# Patient Record
Sex: FEMALE | Race: WHITE | NOT HISPANIC OR LATINO
[De-identification: names, ages, dates, MRNs, and addresses within clinical notes are randomized per-mention and may not be internally consistent; named-entity substitution may affect disease eponyms.]

---
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
## 2018-02-02 ENCOUNTER — APPOINTMENT (OUTPATIENT)
Dept: PLASTIC SURGERY | Facility: CLINIC | Age: 18
End: 2018-02-02
Payer: SELF-PAY

## 2018-02-02 DIAGNOSIS — Z41.1 ENCOUNTER FOR COSMETIC SURGERY: ICD-10-CM

## 2018-02-08 PROBLEM — Z41.1 ENCOUNTER FOR COSMETIC PROCEDURE: Status: ACTIVE | Noted: 2017-09-04

## 2018-03-13 ENCOUNTER — RX RENEWAL (OUTPATIENT)
Age: 18
End: 2018-03-13

## 2018-05-06 ENCOUNTER — EMERGENCY (EMERGENCY)
Facility: HOSPITAL | Age: 18
LOS: 1 days | Discharge: HOME | End: 2018-05-06
Admitting: EMERGENCY MEDICINE
Payer: COMMERCIAL

## 2018-05-06 PROCEDURE — 99284 EMERGENCY DEPT VISIT MOD MDM: CPT | Mod: 25

## 2018-05-07 ENCOUNTER — EMERGENCY (EMERGENCY)
Facility: HOSPITAL | Age: 18
LOS: 1 days | Discharge: HOME | End: 2018-05-07
Admitting: EMERGENCY MEDICINE
Payer: COMMERCIAL

## 2018-05-07 PROCEDURE — 99284 EMERGENCY DEPT VISIT MOD MDM: CPT

## 2018-05-07 PROCEDURE — 99284 EMERGENCY DEPT VISIT MOD MDM: CPT | Mod: 25

## 2018-05-07 PROCEDURE — 80175 DRUG SCREEN QUAN LAMOTRIGINE: CPT

## 2018-05-07 PROCEDURE — 85027 COMPLETE CBC AUTOMATED: CPT

## 2018-05-07 PROCEDURE — 96361 HYDRATE IV INFUSION ADD-ON: CPT

## 2018-05-07 PROCEDURE — 80048 BASIC METABOLIC PNL TOTAL CA: CPT

## 2018-05-07 PROCEDURE — 80177 DRUG SCRN QUAN LEVETIRACETAM: CPT

## 2018-05-07 PROCEDURE — 80307 DRUG TEST PRSMV CHEM ANLYZR: CPT

## 2018-05-07 PROCEDURE — 81025 URINE PREGNANCY TEST: CPT

## 2018-05-07 PROCEDURE — 80076 HEPATIC FUNCTION PANEL: CPT

## 2018-05-07 PROCEDURE — 70450 CT HEAD/BRAIN W/O DYE: CPT

## 2018-05-07 PROCEDURE — 70450 CT HEAD/BRAIN W/O DYE: CPT | Mod: 26

## 2018-05-07 PROCEDURE — 96374 THER/PROPH/DIAG INJ IV PUSH: CPT

## 2018-05-10 ENCOUNTER — APPOINTMENT (OUTPATIENT)
Dept: NEUROLOGY | Facility: CLINIC | Age: 18
End: 2018-05-10

## 2018-05-18 ENCOUNTER — APPOINTMENT (OUTPATIENT)
Dept: NEUROLOGY | Facility: CLINIC | Age: 18
End: 2018-05-18
Payer: COMMERCIAL

## 2018-05-18 VITALS
HEART RATE: 72 BPM | WEIGHT: 135 LBS | SYSTOLIC BLOOD PRESSURE: 119 MMHG | HEIGHT: 66 IN | DIASTOLIC BLOOD PRESSURE: 78 MMHG | BODY MASS INDEX: 21.69 KG/M2

## 2018-05-18 DIAGNOSIS — Z81.8 FAMILY HISTORY OF OTHER MENTAL AND BEHAVIORAL DISORDERS: ICD-10-CM

## 2018-05-18 PROCEDURE — 99205 OFFICE O/P NEW HI 60 MIN: CPT

## 2018-05-18 RX ORDER — LEVETIRACETAM 1000 MG/1
1000 TABLET, FILM COATED ORAL
Refills: 0 | Status: COMPLETED | COMMUNITY

## 2018-05-18 RX ORDER — MECLIZINE HYDROCHLORIDE 25 MG/1
25 TABLET ORAL
Refills: 0 | Status: COMPLETED | COMMUNITY

## 2018-05-25 RX ORDER — LAMOTRIGINE 25 MG/1
25 TABLET ORAL
Qty: 240 | Refills: 6 | Status: COMPLETED | COMMUNITY
Start: 2017-08-18 | End: 2018-05-25

## 2018-05-30 ENCOUNTER — APPOINTMENT (OUTPATIENT)
Dept: NEUROLOGY | Facility: CLINIC | Age: 18
End: 2018-05-30

## 2018-06-05 ENCOUNTER — APPOINTMENT (OUTPATIENT)
Dept: NEUROLOGY | Facility: CLINIC | Age: 18
End: 2018-06-05
Payer: COMMERCIAL

## 2018-06-05 ENCOUNTER — MEDICATION RENEWAL (OUTPATIENT)
Age: 18
End: 2018-06-05

## 2018-06-05 PROCEDURE — 95819 EEG AWAKE AND ASLEEP: CPT

## 2018-06-06 PROCEDURE — 95953: CPT

## 2018-06-18 ENCOUNTER — APPOINTMENT (OUTPATIENT)
Dept: NEUROLOGY | Facility: CLINIC | Age: 18
End: 2018-06-18
Payer: COMMERCIAL

## 2018-06-18 VITALS
HEART RATE: 74 BPM | SYSTOLIC BLOOD PRESSURE: 100 MMHG | DIASTOLIC BLOOD PRESSURE: 50 MMHG | WEIGHT: 137 LBS | BODY MASS INDEX: 22.02 KG/M2 | HEIGHT: 66 IN

## 2018-06-18 VITALS — DIASTOLIC BLOOD PRESSURE: 52 MMHG | SYSTOLIC BLOOD PRESSURE: 98 MMHG

## 2018-06-18 DIAGNOSIS — Z86.59 PERSONAL HISTORY OF OTHER MENTAL AND BEHAVIORAL DISORDERS: ICD-10-CM

## 2018-06-18 DIAGNOSIS — R56.9 UNSPECIFIED CONVULSIONS: ICD-10-CM

## 2018-06-18 PROCEDURE — 99214 OFFICE O/P EST MOD 30 MIN: CPT

## 2018-06-18 RX ORDER — LAMOTRIGINE 150 MG/1
150 TABLET ORAL TWICE DAILY
Qty: 60 | Refills: 5 | Status: DISCONTINUED | COMMUNITY
Start: 2018-05-25 | End: 2018-06-18

## 2018-06-25 ENCOUNTER — RX RENEWAL (OUTPATIENT)
Age: 18
End: 2018-06-25

## 2018-06-26 ENCOUNTER — MESSAGE (OUTPATIENT)
Age: 18
End: 2018-06-26

## 2018-07-27 ENCOUNTER — RX RENEWAL (OUTPATIENT)
Age: 18
End: 2018-07-27

## 2018-07-30 ENCOUNTER — RESULT CHARGE (OUTPATIENT)
Age: 18
End: 2018-07-30

## 2018-07-30 ENCOUNTER — RX RENEWAL (OUTPATIENT)
Age: 18
End: 2018-07-30

## 2018-07-30 ENCOUNTER — APPOINTMENT (OUTPATIENT)
Dept: OBGYN | Facility: CLINIC | Age: 18
End: 2018-07-30
Payer: COMMERCIAL

## 2018-07-30 VITALS
WEIGHT: 130 LBS | HEART RATE: 74 BPM | DIASTOLIC BLOOD PRESSURE: 60 MMHG | SYSTOLIC BLOOD PRESSURE: 100 MMHG | BODY MASS INDEX: 20.89 KG/M2 | HEIGHT: 66 IN | RESPIRATION RATE: 16 BRPM

## 2018-07-30 DIAGNOSIS — Z01.419 ENCOUNTER FOR GYNECOLOGICAL EXAMINATION (GENERAL) (ROUTINE) W/OUT ABNORMAL FINDINGS: ICD-10-CM

## 2018-07-30 DIAGNOSIS — Z30.41 ENCOUNTER FOR SURVEILLANCE OF CONTRACEPTIVE PILLS: ICD-10-CM

## 2018-07-30 LAB
HCG UR QL: NEGATIVE
QUALITY CONTROL: YES

## 2018-07-30 PROCEDURE — 99395 PREV VISIT EST AGE 18-39: CPT

## 2018-07-30 PROCEDURE — 81025 URINE PREGNANCY TEST: CPT

## 2018-07-31 PROBLEM — Z30.41 ENCOUNTER FOR SURVEILLANCE OF CONTRACEPTIVE PILLS: Status: ACTIVE | Noted: 2018-07-31

## 2018-07-31 LAB
C TRACH RRNA SPEC QL NAA+PROBE: NOT DETECTED
N GONORRHOEA RRNA SPEC QL NAA+PROBE: NOT DETECTED
SOURCE AMPLIFICATION: NORMAL

## 2018-08-10 ENCOUNTER — APPOINTMENT (OUTPATIENT)
Dept: NEUROLOGY | Facility: CLINIC | Age: 18
End: 2018-08-10

## 2018-09-18 ENCOUNTER — RX RENEWAL (OUTPATIENT)
Age: 18
End: 2018-09-18

## 2018-09-21 ENCOUNTER — APPOINTMENT (OUTPATIENT)
Dept: OBGYN | Facility: CLINIC | Age: 18
End: 2018-09-21

## 2018-10-03 ENCOUNTER — APPOINTMENT (OUTPATIENT)
Dept: NEUROLOGY | Facility: CLINIC | Age: 18
End: 2018-10-03

## 2018-10-04 ENCOUNTER — APPOINTMENT (OUTPATIENT)
Dept: OBGYN | Facility: CLINIC | Age: 18
End: 2018-10-04
Payer: COMMERCIAL

## 2018-10-04 VITALS
BODY MASS INDEX: 21.69 KG/M2 | RESPIRATION RATE: 16 BRPM | HEART RATE: 80 BPM | DIASTOLIC BLOOD PRESSURE: 60 MMHG | WEIGHT: 135 LBS | HEIGHT: 66 IN | OXYGEN SATURATION: 98 % | SYSTOLIC BLOOD PRESSURE: 102 MMHG

## 2018-10-04 DIAGNOSIS — Z80.42 FAMILY HISTORY OF MALIGNANT NEOPLASM OF PROSTATE: ICD-10-CM

## 2018-10-04 DIAGNOSIS — R10.2 PELVIC AND PERINEAL PAIN: ICD-10-CM

## 2018-10-04 DIAGNOSIS — Z80.0 FAMILY HISTORY OF MALIGNANT NEOPLASM OF DIGESTIVE ORGANS: ICD-10-CM

## 2018-10-04 PROCEDURE — 99214 OFFICE O/P EST MOD 30 MIN: CPT

## 2018-10-17 ENCOUNTER — APPOINTMENT (OUTPATIENT)
Dept: NEUROLOGY | Facility: CLINIC | Age: 18
End: 2018-10-17
Payer: COMMERCIAL

## 2018-10-17 VITALS
HEIGHT: 66 IN | DIASTOLIC BLOOD PRESSURE: 75 MMHG | WEIGHT: 135 LBS | HEART RATE: 91 BPM | SYSTOLIC BLOOD PRESSURE: 117 MMHG | BODY MASS INDEX: 21.69 KG/M2

## 2018-10-17 PROCEDURE — 99214 OFFICE O/P EST MOD 30 MIN: CPT

## 2018-10-19 ENCOUNTER — RX RENEWAL (OUTPATIENT)
Age: 18
End: 2018-10-19

## 2018-11-19 ENCOUNTER — APPOINTMENT (OUTPATIENT)
Dept: NEUROLOGY | Facility: CLINIC | Age: 18
End: 2018-11-19

## 2018-11-21 ENCOUNTER — RX RENEWAL (OUTPATIENT)
Age: 18
End: 2018-11-21

## 2018-12-10 ENCOUNTER — APPOINTMENT (OUTPATIENT)
Dept: OPHTHALMOLOGY | Facility: CLINIC | Age: 18
End: 2018-12-10
Payer: COMMERCIAL

## 2018-12-10 DIAGNOSIS — H10.11 ACUTE ATOPIC CONJUNCTIVITIS, RIGHT EYE: ICD-10-CM

## 2018-12-10 PROCEDURE — 92004 COMPRE OPH EXAM NEW PT 1/>: CPT

## 2018-12-26 ENCOUNTER — RX RENEWAL (OUTPATIENT)
Age: 18
End: 2018-12-26

## 2019-01-16 ENCOUNTER — MEDICATION RENEWAL (OUTPATIENT)
Age: 19
End: 2019-01-16

## 2019-01-16 ENCOUNTER — APPOINTMENT (OUTPATIENT)
Dept: NEUROLOGY | Facility: CLINIC | Age: 19
End: 2019-01-16
Payer: COMMERCIAL

## 2019-01-16 VITALS
BODY MASS INDEX: 21.38 KG/M2 | HEART RATE: 68 BPM | WEIGHT: 133 LBS | SYSTOLIC BLOOD PRESSURE: 98 MMHG | HEIGHT: 66 IN | DIASTOLIC BLOOD PRESSURE: 60 MMHG

## 2019-01-16 PROCEDURE — 99214 OFFICE O/P EST MOD 30 MIN: CPT

## 2019-01-16 NOTE — HISTORY OF PRESENT ILLNESS
[FreeTextEntry1] : Celestino's cell phone - [phone]\par \par *** 01/16/2019 ***\par CELESTINO continues to do well after addition of lamotrigine and tapering of levetiracetam.  She is currently on lamotrigine ER 400mg and levetiracetam 500 q12.  CELESTINO endorses continued problems with anxiety. She has been taking Prozac for 3 years and feels it has not helped. She has therapist that she sees, but not psychiatrist. Pediatrician started Prozac and it was continued by Dr. Higgins. \par \par *** 10/17/2018 ***\par CELESTINO has not had any recurrent events since last visit. Levetiracetam dose reduced to 750 qD.  CELESTINO's main complaint is marked fatigue.  Mother and CELESTINO both feel mood and irritability are better. \par \par *** 06/18/2018 *** \par Ms. CALLEJAS reports no interval seizures. 48h EEG was normal.  Tolerating AEDs ok, taking lamotrigine ER.  Reports increased headaches, but this may be due to increased stresses. She wants to work as  at camp. \par \par *** 5/18/2018 ***\par Ms. CALLEJAS is 18y F who had first seizure at 12y of age.  Feels that seizure happened after argument with boyfriend.  At other seizures, including one before prom - felt stressed out.  Had another seizure after fight with relatives. Last seizure happened beginning of May and CELESTINO feels she was sleep deprived.  At school, she had a seizure, and CELESTINO feels that also occurred as a result of sleep deprivation.  CELESTINO feels all her seizures have occurred when stressed and sleep deprived.  In the past there has been concern that CELESTINO has depression and borderline disorder, but CELESTINO says that these diagnoses had been considered but not established. CELESTINO has been working with therapist in past, but currently does not have one. \par \par Most recent seizure occurred a week ago.  CELESTINO was taken to [facility]. Keppra level was < 2.0, lamotrigine was 1.8.  CT head was unremarkable. \par \par *** prior Hx from Dr. Higgins ***\par \par 8/19/2017: with mother. Currently on Keppra ER 2250mg in AM. Mother feels Keppra makes her depressed. On 11/7/2016 she had seizure episode in college witness by friends. She fell off her chair, GTC lasted 1-2 minutes, denies any cyanosis, vomiting or incontinence with post ictal confusion for 30 minutes. No Diastat was given but was sent to the ER where she was given IV Keppra. Her triggers at this time, she had stayed up all night watching the debate, sick with strep and missed the AM dose that day. Last Keppra level on 8/31/2016 17.1 (12-46)\par Also on Prozac 10mg tablet, 3 tablet daily. In college\par \par 12/20/2017: by herself: Seizure free since 11/2016. Now on Keppra ER 750mg 3 tablets am, Lamictal 25mg, 4 tablets am (did not take evening dose), Prozac 30mg daily.\par  \par 3/8/2016 with her mother. Congested last few days. Had a GTC for 2 minutes when lying in bed after a hot shower. Remained confused with limited speech for about 45 minutes. Labs including Keppra level were obtained in [facility]. Mother will forward us the results. Now on Keppra 750mg BID. \par \par 4/6/2016: Increased Keppra to 1000mg BID in 3/8/2016. Continues to be very tired. Increasing agitation/ anxiety. Goes to sleep around 11pm. Has difficulty falling asleep. Will sleep through the night. Will wake up at 6am. Comes home after school and takes a 3 hour nap. Has tried Melatonin 10 mg 2 hours before bed with no improvement. No seizure episodes since 3/8/2016. Celestino denies missing doses. Doing well in school. Complains of headache's 2x/ week. \par \par 8/29/16:\par Had a seizure June 8, 2016 - was told to increase to 1500 mg BID - did not increase because mom felt was noncompliant with meds Has been taking 1000mg BID. Seizure was grandmal - lasted about a minute. Going to be a freshman in college - [employer] - has accommodations. Having headaches - 1 q3-4 days. Was a  this summer. Jenny states stress is cause for her seizures.\par \par 12/27/2016:\par Currently on Keppra ER 2250mg in AM - she has been complaining of drowsiness since the increase in 8/2016. On 11/7/2016 she had seizure episode in college witness by friends. She fell off her chair, GTC lasted 1-2 minutes, denies any cyanosis, vomiting or incontinence with post ictal confusion for 30 minutes. No Diastat was given but was sent to the ER where she was given IV Keppra. Her triggers at this time, she had stayed up all night watching the debate, sick with strep and missed the AM dose that day. \par Last Keppra level on 8/31/2016 17.1 (12-46)\par \par 12/7/2015 accompanied by her mother. The child first seizure was in December 13, 2014. She was witnessed to have an episode of eye rolling and teeth clenching while sitting in the back seat of the car. Seen in [facility] where a CAT scan of the brain was normal as well other screening labs. Another seizure occur in April 2015. It most likely okay but in the shower. The child came out confused and poorly balanced. Another seizure occurred in September 10, 2015. She fell in the shower and was found laying down unconscious on the floor. He was then seen in the hospital. An MRI of the brain was normal overnight video EEG was normal as well as routine EEG. She was then started on Keppra 750 mg twice daily. Admittedly she was poorly compliant. She had another brief seizure early in November 2015 and since then according to the mother has been taking her medications. Also take Prozac 20 mg once daily. Told not to drive for one year\par

## 2019-01-16 NOTE — PHYSICAL EXAM
[FreeTextEntry1] : alert and oriented x 3, speech fluent, names easily, follows requests, good recall for recent and remote events.\par EOM full without sustained nystagmus, PERRL, face symmetrical, no dysarthria\par Motor - full strength in all extremities. normal rapid-alternating movements.\par Sensory - intact LT bilaterally\par Coord - no tremor, ataxia\par Gait - stands without difficulty, normal gait.\par

## 2019-01-18 ENCOUNTER — MEDICATION RENEWAL (OUTPATIENT)
Age: 19
End: 2019-01-18

## 2019-01-18 LAB
LAMOTRIGINE SERPL-MCNC: 7.6 MCG/ML
LEVETIRACETAM SERPL-MCNC: 12.6 MCG/ML

## 2019-02-05 ENCOUNTER — APPOINTMENT (OUTPATIENT)
Dept: NEUROLOGY | Facility: CLINIC | Age: 19
End: 2019-02-05
Payer: COMMERCIAL

## 2019-02-05 ENCOUNTER — RX RENEWAL (OUTPATIENT)
Age: 19
End: 2019-02-05

## 2019-02-05 PROCEDURE — 95819 EEG AWAKE AND ASLEEP: CPT

## 2019-02-06 PROCEDURE — 95953: CPT

## 2019-02-07 ENCOUNTER — OTHER (OUTPATIENT)
Age: 19
End: 2019-02-07

## 2019-02-07 PROCEDURE — 95953: CPT

## 2019-02-12 ENCOUNTER — APPOINTMENT (OUTPATIENT)
Dept: NEUROLOGY | Facility: CLINIC | Age: 19
End: 2019-02-12

## 2019-02-14 ENCOUNTER — MEDICATION RENEWAL (OUTPATIENT)
Age: 19
End: 2019-02-14

## 2019-02-14 ENCOUNTER — OTHER (OUTPATIENT)
Age: 19
End: 2019-02-14

## 2019-02-15 ENCOUNTER — RX RENEWAL (OUTPATIENT)
Age: 19
End: 2019-02-15

## 2019-02-22 ENCOUNTER — APPOINTMENT (OUTPATIENT)
Dept: NEUROLOGY | Facility: CLINIC | Age: 19
End: 2019-02-22
Payer: COMMERCIAL

## 2019-02-22 VITALS
HEIGHT: 66 IN | SYSTOLIC BLOOD PRESSURE: 118 MMHG | DIASTOLIC BLOOD PRESSURE: 79 MMHG | WEIGHT: 133 LBS | HEART RATE: 67 BPM | BODY MASS INDEX: 21.38 KG/M2

## 2019-02-22 DIAGNOSIS — F41.9 ANXIETY DISORDER, UNSPECIFIED: ICD-10-CM

## 2019-02-22 PROCEDURE — 99214 OFFICE O/P EST MOD 30 MIN: CPT

## 2019-02-22 RX ORDER — TOBRAMYCIN AND DEXAMETHASONE 3; 1 MG/ML; MG/ML
0.3-0.1 SUSPENSION/ DROPS OPHTHALMIC
Qty: 1 | Refills: 0 | Status: COMPLETED | COMMUNITY
Start: 2018-12-10 | End: 2019-02-22

## 2019-02-22 RX ORDER — LAMOTRIGINE 100 MG/1
100 TABLET, EXTENDED RELEASE ORAL
Qty: 30 | Refills: 5 | Status: COMPLETED | COMMUNITY
Start: 2019-01-18 | End: 2019-02-22

## 2019-02-22 RX ORDER — SERTRALINE HYDROCHLORIDE 50 MG/1
50 TABLET, FILM COATED ORAL
Qty: 30 | Refills: 5 | Status: COMPLETED | COMMUNITY
Start: 2019-01-16 | End: 2019-02-22

## 2019-02-22 NOTE — ASSESSMENT
[FreeTextEntry1] : CELESTINO is 18y F with h/o occasional convulsion since age 12. Has been on Keppra to very high dose and had mood and personality change, now taking lower dose levetiracetam and low-mod dose lamotrigine - with breakthrough seizure in setting of sleep deprivation beginning May 2018 with AED levels found to be low in ED. \par \par Plan:\par 1. Keppra dc'd 3 wks ago\par 2. continue lamotrigine 400 qD\par 3. repeat lamotrigine level \par 4. replace sertraline with Paxil - start 10 qD, increase in 2 weeks to 20 qD\par 5. RTC 2 mo - sooner if there are concerns. \par \par Greater than 50% of the encounter time was spent on counseling and coordination of care for reviewing records in [employer], discussion with patient regarding plan. I have spent 25 minutes of face to face time with the patient reviewing the cause of seizures or seizure-like events, assessing the risk of recurrence, educating the patient or family to recognize seizures, and discussing possible treatment options and possible side effects of seizure medications. I also discussed seizure safety, and ways of reducing seizure risk.\par

## 2019-02-22 NOTE — PHYSICAL EXAM
[FreeTextEntry1] : funduscopic exam is normal \par \par alert and oriented x 3, speech fluent, names easily, follows requests, good recall for recent and remote events.\par EOM full without sustained nystagmus, PERRL, face symmetrical, no dysarthria\par Motor - full strength in all extremities. normal rapid-alternating movements.\par Sensory - intact LT bilaterally\par Coord - no tremor, ataxia\par Gait - stands without difficulty, normal gait.

## 2019-02-22 NOTE — HISTORY OF PRESENT ILLNESS
[FreeTextEntry1] : Celestino's cell phone - [phone]\par \par *** 02/22/2019 ***\par CELESTINO discontinued levetiracetam about 3 weeks ago, now on lamotrigine monotherapy. Last level 7.6 on 300mg daily. At last visit, Prozac tapered off and replaced by sertraline 50mg. Since then CELESTINO's mother feels that CELESTINO is more irritable. CELESTINO does not think this is the case and attributes irritability to environmental triggers. However, CELESTINO endorses daily headache since starting sertraline.  She has h/o headache beginning with menarche. She uses OTC analgesics 3d/week, but not daily, and drinks one cup of coffee per day per routine. \par \par CELESTINO is not under care of psychiatrist, though she is receptive to the idea. CELESTINO's mother is concerned for increased risk of seizures with some psychiatric medications and of possibly introducing use of Xanax. \par *** 01/16/2019 ***\par CELESTINO continues to do well after addition of lamotrigine and tapering of levetiracetam.  She is currently on lamotrigine ER 400mg and levetiracetam 500 q12.  CELESTINO endorses continued problems with anxiety. She has been taking Prozac for 3 years and feels it has not helped. She has therapist that she sees, but not psychiatrist. Pediatrician started Prozac and it was continued by Dr. Higgins. \par \par *** 10/17/2018 ***\par CELESTINO has not had any recurrent events since last visit. Levetiracetam dose reduced to 750 qD.  CELESTINO's main complaint is marked fatigue.  Mother and CELESTINO both feel mood and irritability are better. \par \par *** 06/18/2018 *** \par Ms. CALLEJAS reports no interval seizures. 48h EEG was normal.  Tolerating AEDs ok, taking lamotrigine ER.  Reports increased headaches, but this may be due to increased stresses. She wants to work as  at camp. \par \par *** 5/18/2018 ***\par Ms. CALLEJAS is 18y F who had first seizure at 12y of age.  Feels that seizure happened after argument with boyfriend.  At other seizures, including one before prom - felt stressed out.  Had another seizure after fight with relatives. Last seizure happened beginning of May and CELESTINO feels she was sleep deprived.  At school, she had a seizure, and CELESTINO feels that also occurred as a result of sleep deprivation.  CELESTINO feels all her seizures have occurred when stressed and sleep deprived.  In the past there has been concern that CELESTINO has depression and borderline disorder, but CELESTINO says that these diagnoses had been considered but not established. CELESTINO has been working with therapist in past, but currently does not have one. \par \par Most recent seizure occurred a week ago.  CELESTINO was taken to [facility]. Keppra level was < 2.0, lamotrigine was 1.8.  CT head was unremarkable. \par \par *** prior Hx from Dr. Higgins ***\par \par 8/19/2017: with mother. Currently on Keppra ER 2250mg in AM. Mother feels Keppra makes her depressed. On 11/7/2016 she had seizure episode in college witness by friends. She fell off her chair, GTC lasted 1-2 minutes, denies any cyanosis, vomiting or incontinence with post ictal confusion for 30 minutes. No Diastat was given but was sent to the ER where she was given IV Keppra. Her triggers at this time, she had stayed up all night watching the debate, sick with strep and missed the AM dose that day. Last Keppra level on 8/31/2016 17.1 (12-46)\par Also on Prozac 10mg tablet, 3 tablet daily. In college\par \par 12/20/2017: by herself: Seizure free since 11/2016. Now on Keppra ER 750mg 3 tablets am, Lamictal 25mg, 4 tablets am (did not take evening dose), Prozac 30mg daily.\par  \par 3/8/2016 with her mother. Congested last few days. Had a GTC for 2 minutes when lying in bed after a hot shower. Remained confused with limited speech for about 45 minutes. Labs including Keppra level were obtained in [facility]. Mother will forward us the results. Now on Keppra 750mg BID. \par \par 4/6/2016: Increased Keppra to 1000mg BID in 3/8/2016. Continues to be very tired. Increasing agitation/ anxiety. Goes to sleep around 11pm. Has difficulty falling asleep. Will sleep through the night. Will wake up at 6am. Comes home after school and takes a 3 hour nap. Has tried Melatonin 10 mg 2 hours before bed with no improvement. No seizure episodes since 3/8/2016. Celestino denies missing doses. Doing well in school. Complains of headache's 2x/ week. \par \par 8/29/16:\par Had a seizure June 8, 2016 - was told to increase to 1500 mg BID - did not increase because mom felt was noncompliant with meds Has been taking 1000mg BID. Seizure was grandmal - lasted about a minute. Going to be a freshman in college - [employer] - has accommodations. Having headaches - 1 q3-4 days. Was a  this summer. Jenny states stress is cause for her seizures.\par \par 12/27/2016:\par Currently on Keppra ER 2250mg in AM - she has been complaining of drowsiness since the increase in 8/2016. On 11/7/2016 she had seizure episode in college witness by friends. She fell off her chair, GTC lasted 1-2 minutes, denies any cyanosis, vomiting or incontinence with post ictal confusion for 30 minutes. No Diastat was given but was sent to the ER where she was given IV Keppra. Her triggers at this time, she had stayed up all night watching the debate, sick with strep and missed the AM dose that day. \par Last Keppra level on 8/31/2016 17.1 (12-46)\par \par 12/7/2015 accompanied by her mother. The child first seizure was in December 13, 2014. She was witnessed to have an episode of eye rolling and teeth clenching while sitting in the back seat of the car. Seen in [facility] where a CAT scan of the brain was normal as well other screening labs. Another seizure occur in April 2015. It most likely okay but in the shower. The child came out confused and poorly balanced. Another seizure occurred in September 10, 2015. She fell in the shower and was found laying down unconscious on the floor. He was then seen in the hospital. An MRI of the brain was normal overnight video EEG was normal as well as routine EEG. She was then started on Keppra 750 mg twice daily. Admittedly she was poorly compliant. She had another brief seizure early in November 2015 and since then according to the mother has been taking her medications. Also take Prozac 20 mg once daily. Told not to drive for one year\par

## 2019-03-13 ENCOUNTER — TRANSCRIPTION ENCOUNTER (OUTPATIENT)
Age: 19
End: 2019-03-13

## 2019-03-14 ENCOUNTER — MEDICATION RENEWAL (OUTPATIENT)
Age: 19
End: 2019-03-14

## 2019-04-01 ENCOUNTER — RX RENEWAL (OUTPATIENT)
Age: 19
End: 2019-04-01

## 2019-04-05 ENCOUNTER — APPOINTMENT (OUTPATIENT)
Dept: INTERNAL MEDICINE | Facility: CLINIC | Age: 19
End: 2019-04-05
Payer: COMMERCIAL

## 2019-04-05 VITALS
OXYGEN SATURATION: 98 % | WEIGHT: 124 LBS | BODY MASS INDEX: 19.93 KG/M2 | TEMPERATURE: 98.7 F | HEIGHT: 66 IN | SYSTOLIC BLOOD PRESSURE: 112 MMHG | DIASTOLIC BLOOD PRESSURE: 70 MMHG | RESPIRATION RATE: 16 BRPM | HEART RATE: 80 BPM

## 2019-04-05 DIAGNOSIS — Z78.9 OTHER SPECIFIED HEALTH STATUS: ICD-10-CM

## 2019-04-05 DIAGNOSIS — R23.8 OTHER SKIN CHANGES: ICD-10-CM

## 2019-04-05 DIAGNOSIS — Z28.21 IMMUNIZATION NOT CARRIED OUT BECAUSE OF PATIENT REFUSAL: ICD-10-CM

## 2019-04-05 PROCEDURE — 99203 OFFICE O/P NEW LOW 30 MIN: CPT

## 2019-04-05 RX ORDER — PAROXETINE HYDROCHLORIDE 10 MG/1
10 TABLET, FILM COATED ORAL
Qty: 60 | Refills: 5 | Status: DISCONTINUED | COMMUNITY
Start: 2019-02-22 | End: 2019-04-05

## 2019-04-05 NOTE — HISTORY OF PRESENT ILLNESS
[FreeTextEntry1] : New pt [de-identified] : hx Seizures; follows with \par \par Was at [facility] came home \par \par Easy bruising

## 2019-04-05 NOTE — HEALTH RISK ASSESSMENT
[Good] : ~his/her~  mood as  good [No falls in past year] : Patient reported no falls in the past year [] : No [de-identified] : occ [de-identified] : occ maji [de-identified] : exercises - runs  [de-identified] : good

## 2019-04-09 ENCOUNTER — RX RENEWAL (OUTPATIENT)
Age: 19
End: 2019-04-09

## 2019-04-09 DIAGNOSIS — D50.8 OTHER IRON DEFICIENCY ANEMIAS: ICD-10-CM

## 2019-04-09 LAB
25(OH)D3 SERPL-MCNC: 45.6 NG/ML
ALBUMIN SERPL ELPH-MCNC: 4.6 G/DL
ALP BLD-CCNC: 49 U/L
ALT SERPL-CCNC: 14 U/L
ANION GAP SERPL CALC-SCNC: 12 MMOL/L
APTT BLD: 26 SEC
AST SERPL-CCNC: 13 U/L
BASOPHILS # BLD AUTO: 0.05 K/UL
BASOPHILS NFR BLD AUTO: 0.9 %
BILIRUB SERPL-MCNC: 0.2 MG/DL
BUN SERPL-MCNC: 20 MG/DL
CALCIUM SERPL-MCNC: 9.6 MG/DL
CHLORIDE SERPL-SCNC: 105 MMOL/L
CHOLEST SERPL-MCNC: 194 MG/DL
CHOLEST/HDLC SERPL: 1.9 RATIO
CO2 SERPL-SCNC: 25 MMOL/L
CREAT SERPL-MCNC: 1.13 MG/DL
EOSINOPHIL # BLD AUTO: 0.15 K/UL
EOSINOPHIL NFR BLD AUTO: 2.6 %
ESTIMATED AVERAGE GLUCOSE: 94 MG/DL
GLUCOSE SERPL-MCNC: 81 MG/DL
HBA1C MFR BLD HPLC: 4.9 %
HCT VFR BLD CALC: 35.4 %
HDLC SERPL-MCNC: 104 MG/DL
HGB BLD-MCNC: 11 G/DL
IMM GRANULOCYTES NFR BLD AUTO: 0.2 %
INR PPP: 0.92 RATIO
LAMOTRIGINE SERPL-MCNC: 7.8 MCG/ML
LDLC SERPL CALC-MCNC: 75 MG/DL
LYMPHOCYTES # BLD AUTO: 1.64 K/UL
LYMPHOCYTES NFR BLD AUTO: 28.5 %
MAN DIFF?: NORMAL
MCHC RBC-ENTMCNC: 25.8 PG
MCHC RBC-ENTMCNC: 31.1 GM/DL
MCV RBC AUTO: 83.1 FL
MONOCYTES # BLD AUTO: 0.44 K/UL
MONOCYTES NFR BLD AUTO: 7.6 %
NEUTROPHILS # BLD AUTO: 3.47 K/UL
NEUTROPHILS NFR BLD AUTO: 60.2 %
PLATELET # BLD AUTO: 353 K/UL
POTASSIUM SERPL-SCNC: 4.1 MMOL/L
PROT SERPL-MCNC: 6.8 G/DL
PT BLD: 10.6 SEC
RBC # BLD: 4.26 M/UL
RBC # FLD: 14.1 %
SODIUM SERPL-SCNC: 142 MMOL/L
T4 FREE SERPL-MCNC: 1.1 NG/DL
TRIGL SERPL-MCNC: 74 MG/DL
TSH SERPL-ACNC: 0.92 UIU/ML
WBC # FLD AUTO: 5.76 K/UL

## 2019-04-11 ENCOUNTER — EMERGENCY (EMERGENCY)
Facility: HOSPITAL | Age: 19
LOS: 1 days | Discharge: HOME | End: 2019-04-11
Attending: EMERGENCY MEDICINE | Admitting: EMERGENCY MEDICINE
Payer: COMMERCIAL

## 2019-04-11 VITALS
HEART RATE: 77 BPM | RESPIRATION RATE: 18 BRPM | OXYGEN SATURATION: 98 % | DIASTOLIC BLOOD PRESSURE: 63 MMHG | SYSTOLIC BLOOD PRESSURE: 104 MMHG

## 2019-04-11 VITALS
DIASTOLIC BLOOD PRESSURE: 76 MMHG | RESPIRATION RATE: 18 BRPM | HEIGHT: 65 IN | TEMPERATURE: 98 F | OXYGEN SATURATION: 99 % | WEIGHT: 125 LBS | HEART RATE: 98 BPM | SYSTOLIC BLOOD PRESSURE: 111 MMHG

## 2019-04-11 LAB
ALBUMIN SERPL ELPH-MCNC: 3.9 G/DL — SIGNIFICANT CHANGE UP (ref 3.3–5)
ALP SERPL-CCNC: 54 U/L — SIGNIFICANT CHANGE UP (ref 40–120)
ALT FLD-CCNC: 19 U/L DA — SIGNIFICANT CHANGE UP (ref 10–45)
ANION GAP SERPL CALC-SCNC: 9 MMOL/L — SIGNIFICANT CHANGE UP (ref 5–17)
AST SERPL-CCNC: 15 U/L — SIGNIFICANT CHANGE UP (ref 10–40)
BASOPHILS # BLD AUTO: 0.1 K/UL — SIGNIFICANT CHANGE UP (ref 0–0.2)
BASOPHILS NFR BLD AUTO: 0.7 % — SIGNIFICANT CHANGE UP (ref 0–2)
BILIRUB SERPL-MCNC: 0.1 MG/DL — LOW (ref 0.2–1.2)
BUN SERPL-MCNC: 14 MG/DL — SIGNIFICANT CHANGE UP (ref 7–23)
CALCIUM SERPL-MCNC: 8.9 MG/DL — SIGNIFICANT CHANGE UP (ref 8.4–10.5)
CHLORIDE SERPL-SCNC: 104 MMOL/L — SIGNIFICANT CHANGE UP (ref 96–108)
CO2 SERPL-SCNC: 25 MMOL/L — SIGNIFICANT CHANGE UP (ref 22–31)
CREAT SERPL-MCNC: 0.76 MG/DL — SIGNIFICANT CHANGE UP (ref 0.5–1.3)
EOSINOPHIL # BLD AUTO: 0.1 K/UL — SIGNIFICANT CHANGE UP (ref 0–0.5)
EOSINOPHIL NFR BLD AUTO: 1.6 % — SIGNIFICANT CHANGE UP (ref 0–6)
GLUCOSE SERPL-MCNC: 83 MG/DL — SIGNIFICANT CHANGE UP (ref 70–99)
HCT VFR BLD CALC: 35.3 % — SIGNIFICANT CHANGE UP (ref 34.5–45)
HGB BLD-MCNC: 11.2 G/DL — LOW (ref 11.5–15.5)
LYMPHOCYTES # BLD AUTO: 1.5 K/UL — SIGNIFICANT CHANGE UP (ref 1–3.3)
LYMPHOCYTES # BLD AUTO: 16.8 % — SIGNIFICANT CHANGE UP (ref 13–44)
MCHC RBC-ENTMCNC: 26 PG — LOW (ref 27–34)
MCHC RBC-ENTMCNC: 31.7 GM/DL — LOW (ref 32–36)
MCV RBC AUTO: 82 FL — SIGNIFICANT CHANGE UP (ref 80–100)
MONOCYTES # BLD AUTO: 0.6 K/UL — SIGNIFICANT CHANGE UP (ref 0–0.9)
MONOCYTES NFR BLD AUTO: 6.9 % — SIGNIFICANT CHANGE UP (ref 1–9)
NEUTROPHILS # BLD AUTO: 6.7 K/UL — SIGNIFICANT CHANGE UP (ref 1.8–7.4)
NEUTROPHILS NFR BLD AUTO: 74 % — SIGNIFICANT CHANGE UP (ref 43–77)
PLATELET # BLD AUTO: 308 K/UL — SIGNIFICANT CHANGE UP (ref 150–400)
POTASSIUM SERPL-MCNC: 3.7 MMOL/L — SIGNIFICANT CHANGE UP (ref 3.5–5.3)
POTASSIUM SERPL-SCNC: 3.7 MMOL/L — SIGNIFICANT CHANGE UP (ref 3.5–5.3)
PROT SERPL-MCNC: 7.4 G/DL — SIGNIFICANT CHANGE UP (ref 6–8.3)
RBC # BLD: 4.3 M/UL — SIGNIFICANT CHANGE UP (ref 3.8–5.2)
RBC # FLD: 13.9 % — SIGNIFICANT CHANGE UP (ref 10.3–14.5)
SALICYLATES SERPL-MCNC: 0.8 MG/DL — LOW (ref 3–30)
SODIUM SERPL-SCNC: 138 MMOL/L — SIGNIFICANT CHANGE UP (ref 135–145)
WBC # BLD: 9 K/UL — SIGNIFICANT CHANGE UP (ref 3.8–10.5)
WBC # FLD AUTO: 9 K/UL — SIGNIFICANT CHANGE UP (ref 3.8–10.5)

## 2019-04-11 PROCEDURE — 99284 EMERGENCY DEPT VISIT MOD MDM: CPT | Mod: 25

## 2019-04-11 PROCEDURE — 96374 THER/PROPH/DIAG INJ IV PUSH: CPT

## 2019-04-11 PROCEDURE — 99284 EMERGENCY DEPT VISIT MOD MDM: CPT

## 2019-04-11 PROCEDURE — 80053 COMPREHEN METABOLIC PANEL: CPT

## 2019-04-11 PROCEDURE — 85027 COMPLETE CBC AUTOMATED: CPT

## 2019-04-11 PROCEDURE — 80175 DRUG SCREEN QUAN LAMOTRIGINE: CPT

## 2019-04-11 PROCEDURE — 36415 COLL VENOUS BLD VENIPUNCTURE: CPT

## 2019-04-11 PROCEDURE — 80307 DRUG TEST PRSMV CHEM ANLYZR: CPT

## 2019-04-11 RX ORDER — SODIUM CHLORIDE 9 MG/ML
1000 INJECTION INTRAMUSCULAR; INTRAVENOUS; SUBCUTANEOUS ONCE
Qty: 0 | Refills: 0 | Status: COMPLETED | OUTPATIENT
Start: 2019-04-11 | End: 2019-04-11

## 2019-04-11 RX ORDER — KETOROLAC TROMETHAMINE 30 MG/ML
15 SYRINGE (ML) INJECTION ONCE
Qty: 0 | Refills: 0 | Status: DISCONTINUED | OUTPATIENT
Start: 2019-04-11 | End: 2019-04-11

## 2019-04-11 RX ADMIN — Medication 15 MILLIGRAM(S): at 20:10

## 2019-04-11 RX ADMIN — SODIUM CHLORIDE 2000 MILLILITER(S): 9 INJECTION INTRAMUSCULAR; INTRAVENOUS; SUBCUTANEOUS at 20:09

## 2019-04-11 NOTE — ED PROVIDER NOTE - NSFOLLOWUPINSTRUCTIONS_ED_ALL_ED_FT
Continue all of your medications as prescribed.  Follow up with your neurologist tomorrow. Call to schedule an appointment.  Do not drink or take any drugs while on your medications.  Any worsening of symptoms or new concerning symptoms return to the ED

## 2019-04-11 NOTE — ED PROVIDER NOTE - OBJECTIVE STATEMENT
pt 18 yo f hx seizure disorder. pt p/w seizure witnessed PTA that lasted 1min. did not hit her head or injury her tongue/mouth. last seizure was 1 year ago. keppra was decreased 2 months ago and has bw last week and was told level for lamotrigine was anl pt 18 yo f hx seizure disorder. pt p/w seizure witnessed PTA that lasted 1min. did not hit her head or injury her tongue/mouth. last seizure was 1 year ago. keppra was decreased 2 months ago and has bw last week and was told level for lamotrigine was normal  pt is c/o slight headache but otherwise feeling well pt 20 yo f hx seizure disorder. pt p/w seizure witnessed PTA that lasted 1min. did not hit her head or injury her tongue/mouth. last seizure was 1 year ago. keppra was decreased 2 months ago and has bw last week and was told level for lamotrigine was normal  pt is c/o slight headache but otherwise feeling well  denies fever, chills, abd pain, n/v, vison changes, urinary incontinence, tremor, hallucinations  does drink. last drink 3 days ago. but denies drug use.  states she is taking her medication daily

## 2019-04-11 NOTE — ED ADULT NURSE NOTE - INTERVENTIONS DEFINITIONS
Call bell, personal items and telephone within reach/Provide visual cue, wrist band, yellow gown, etc./Room bathroom lighting operational/Stretcher in lowest position, wheels locked, appropriate side rails in place/[place] to call system/Instruct patient to call for assistance/Physically safe environment: no spills, clutter or unnecessary equipment

## 2019-04-11 NOTE — ED PROVIDER NOTE - PHYSICAL EXAMINATION
General:     NAD, well-nourished, well-appearing  Eyes: PERRL  Head:     NC/AT, EOMI, oral mucosa moist. no injury to oral mucosa or tongue  Neck:     trachea midline  Lungs:     CTA b/l  CVS:     RRR  Abd:     +BS, s/nt/nd  Ext:   no deformities, no tremor  Skin: dry. no trauma  Neuro: AAOx3, no sensory/motor deficits

## 2019-04-11 NOTE — ED ADULT NURSE NOTE - OBJECTIVE STATEMENT
Pt has siezure history, pt was standing in [employer] on line waiting to pay and had a siezure and does not remember anything.  Episode witnessed by friend, pt brought in by EMS, pt with history of heavy alcohol use, pt states last alcohol use was last weekend, pt had recent bloodwork and physical with Dr Reyes and states her levels were normal.  Pt VSS.

## 2019-04-11 NOTE — ED PROVIDER NOTE - PROGRESS NOTE DETAILS
headache decreased. does not want to stay for IVF to finish. Dr. galindo discussed with pt and pts mother that pt had a breakthrough seizure and needs to follow up with her neurologist. both pt and mother understood plan

## 2019-04-11 NOTE — ED PROVIDER NOTE - CLINICAL SUMMARY MEDICAL DECISION MAKING FREE TEXT BOX
pt 20 yo f hx seizure disorder. pt p/w seizure witnessed PTA that lasted 1min. did not hit her head or injury her tongue/mouth. last seizure was 1 year ago. keppra was decreased 2 months ago and has bw last week and was told level for lamotrigine was normal. pt was c/o slight headache but now resolved. discussed not using any drugs or ETOH. Basic labs are normal and exam benign. Discussed with patient need to return to ED if symptoms don't continue to improve or recur or develops any new or worsening symptoms that are of concern.

## 2019-04-11 NOTE — ED PROVIDER NOTE - ATTENDING CONTRIBUTION TO CARE
I have personally seen and examined this patient. I have fully participated in the care of this patient. I have reviewed all pertinent clinical information, including history physical exam, plan and the PA's note and agree except as noted    18 y/o f with h/o Seizure on Lamotrigine, with therapeutic level that was measures two days ago, BIB family c/o she had seizure while she was at [employer], it was brief episode, no tongue bite or incontinence,  no recent illness. Pt feels fine now.  onexam: General:     NAD, well-nourished, well-appearing  Head:     NC/AT, EOMI, oral mucosa moist  Neck:     supple  Lungs:     CTA b/l, no w/r/r  CVS:     S1S2, RRR, no m/g/r  Abd:     +BS, s/nt/nd, no organomegaly  Ext:    2+ radial and pedal pulses, no c/c/e  Neuro: grossly intact  Impression: likely break through seizure,   needs to F/U with her neurologist

## 2019-04-12 DIAGNOSIS — G40.309 GENERALIZED IDIOPATHIC EPILEPSY AND EPILEPTIC SYNDROMES, NOT INTRACTABLE, W/OUT STATUS EPILEPTICUS: ICD-10-CM

## 2019-04-13 LAB — LAMOTRIGINE SERPL-MCNC: 8.1 MCG/ML — SIGNIFICANT CHANGE UP (ref 2.5–15)

## 2019-05-01 ENCOUNTER — MEDICATION RENEWAL (OUTPATIENT)
Age: 19
End: 2019-05-01

## 2019-05-01 ENCOUNTER — RX RENEWAL (OUTPATIENT)
Age: 19
End: 2019-05-01

## 2019-05-14 ENCOUNTER — TRANSCRIPTION ENCOUNTER (OUTPATIENT)
Age: 19
End: 2019-05-14

## 2019-05-16 ENCOUNTER — EMERGENCY (EMERGENCY)
Facility: HOSPITAL | Age: 19
LOS: 1 days | Discharge: HOME | End: 2019-05-16
Attending: EMERGENCY MEDICINE | Admitting: EMERGENCY MEDICINE
Payer: COMMERCIAL

## 2019-05-16 ENCOUNTER — APPOINTMENT (OUTPATIENT)
Dept: INTERNAL MEDICINE | Facility: CLINIC | Age: 19
End: 2019-05-16

## 2019-05-16 VITALS
SYSTOLIC BLOOD PRESSURE: 100 MMHG | RESPIRATION RATE: 15 BRPM | DIASTOLIC BLOOD PRESSURE: 70 MMHG | HEIGHT: 65 IN | OXYGEN SATURATION: 96 % | TEMPERATURE: 98 F | WEIGHT: 128.97 LBS | HEART RATE: 98 BPM

## 2019-05-16 PROCEDURE — 99283 EMERGENCY DEPT VISIT LOW MDM: CPT

## 2019-05-16 RX ORDER — FLUOXETINE HCL 10 MG
30 CAPSULE ORAL
Qty: 0 | Refills: 0 | DISCHARGE

## 2019-05-16 RX ORDER — ACYCLOVIR SODIUM 500 MG
1 VIAL (EA) INTRAVENOUS
Qty: 28 | Refills: 0
Start: 2019-05-16 | End: 2019-05-22

## 2019-05-16 RX ORDER — LAMOTRIGINE 25 MG/1
0 TABLET, ORALLY DISINTEGRATING ORAL
Qty: 0 | Refills: 0 | DISCHARGE

## 2019-05-16 RX ORDER — LAMOTRIGINE 25 MG/1
500 TABLET, ORALLY DISINTEGRATING ORAL
Qty: 0 | Refills: 0 | DISCHARGE

## 2019-05-16 NOTE — ED ADULT NURSE NOTE - NSIMPLEMENTINTERV_GEN_ALL_ED
Implemented All Universal Safety Interventions:  [place] to call system. Call bell, personal items and telephone within reach. Instruct patient to call for assistance. Room bathroom lighting operational. Non-slip footwear when patient is off stretcher. Physically safe environment: no spills, clutter or unnecessary equipment. Stretcher in lowest position, wheels locked, appropriate side rails in place.

## 2019-05-16 NOTE — ED ADULT NURSE NOTE - NS ED NURSE LEVEL OF CONSCIOUSNESS ORIENTATION
ED General





- General


Chief Complaint: Abdominal Pain


Stated Complaint: ABDOMINAL PAIN


Time Seen by Provider: 01/26/19 23:05


Notes: 


Patient is a 21-year-old male presents with complaint of abdominal pain.  

Patient says it started approximately now one half ago.  He had eaten several 

hours before.  He said vomiting.  Pain is in the central portion of his abdomen 

just above his umbilicus.  He says this is where most pain is but it does 

radiate throughout his abdomen.  He did have a umbilical hernia repair 

approximately 2-3 months ago.  This was performed at [facility].  No blood in his 

emesis.  No blood in the stool.  No other complaints at this time.





TRAVEL OUTSIDE OF THE [place] IN LAST 30 DAYS: No





- Related Data


Allergies/Adverse Reactions: 


                                        





No Known Allergies Allergy (Unverified 01/26/19 22:54)


   











Past Medical History





- Social History


Smoking Status: Never Smoker


Frequency of alcohol use: Occasional


Drug Abuse: None


Family History: Reviewed & Not Pertinent


Patient has suicidal ideation: No


Patient has homicidal ideation: No


Renal/ Medical History: Denies: Hx Peritoneal Dialysis


Past Surgical History: Reports: Hx Abdominal Surgery - hernia repair





Review of Systems





- Review of Systems


Notes: 


My Normal Review Basic





REVIEW OF SYSTEMS:


CONSTITUTIONAL :  Denies fever,  chills, or sweats.  Denies recent illness.


EENT:   Denies eye, ear, throat, or mouth pain or symptoms.  Denies nasal or 

sinus congestion.


RESPIRATORY:  Denies cough, cold, or chest congestion.  Denies shortness of 

breath, difficulty breathing, or wheezing.


GASTROINTESTINAL: Severe abdominal pain


MUSCULOSKELETAL:  Denies neck or back pain or joint pain or swelling.


SKIN:   Denies rash or skin lesions.


NEUROLOGICAL:  Denies altered mental status or loss of consciousness.  Denies 

headache.  Denies weakness or paralysis or loss of use of either side.  Denies 

problems with gait or speech.  Denies sensory or motor loss.


ALL OTHER SYSTEMS REVIEWED AND NEGATIVE.








Physical Exam





- Vital signs


Vitals: 


                                        











Temp Pulse Resp BP Pulse Ox


 


 97.6 F   71   18   141/90 H  100 


 


 01/26/19 22:51  01/26/19 22:51  01/26/19 22:51  01/26/19 22:51  01/26/19 22:51














- Notes


Notes: 





General Appearance: Well nourished, alert, cooperative, no acute distress, 

severe obvious discomfort.


Vitals: reviewed, See vital signs table.


Eyes: PERRL, EOMI, Conjuctiva clear


Neck: Supple, no neck tenderness, No thyromegaly


Lungs: No wheezing, No rales, No rhonci, No accessory muscle use, good air 

exchange bilaterally.


Heart: Normal rate, Regular rythm, No murmur, no rub


Abdomen: Normal BS, soft, No rigidity, patient has pain throughout the abdomen 

to palpation.  It is worse over the correct ventral portion of the abdomen.  I 

cannot feel a hernia on deep palpation of the abdomen.  Patient does have a 

significant amount of guarding.


Extremities: strength 5/5 in all extremities, good pulses in all extremities, no

swelling or tenderness in the extremities, no edema.


Skin: warm, dry, appropriate color, no rash


Neuro: speech clear, oriented x 3, normal affect, responds appropriately to 

questions.





Course





- Re-evaluation


Re-evalutation: 





01/26/19 23:17


I am concerned the patient just recently had a hernia repair surgery.  Concerned

that he could have recurrence of the hernia with incarceration however I cannot 

confirm this on my exam as I cannot feel hernia currently.  I will have the 

patient go directly to CT scan.  Of ordered a CT scan with IV contrast.  I have 

ordered pain medicine this patient has a lot of pain.


01/26/19 23:59





On reevaluation patient is still having a lot of pain despite the pain medicine.

 CT scan was done immediately and did not show any acute findings except for 

appendicolith but they did not see evidence of appendicitis otherwise.  Patient 

has a white count of 20,000.  He still in a lot of pain.  Of ordered more pain 

medicine.  I will call the surgeon to have him come evaluate the patient.


01/27/19 00:41








Dr. Nicolas, general surgeon, did evaluate the patient.  He has evaluated the 

patient, reviewed labs, and reviewed his CT scan.  He says it is unclear exactly

what is causing patient's all pain at this time.  He says he does not feel that 

he needs to go to the OR acutely.  He recommends patient be admitted for 

observation and they can do repeat serial exams.  He recommends patient be 

admitted to medicine and they would follow along and monitor the patient's 

abdomen as well.  I will contact the hospitalist to see if they are in agreement

with admitting the patient.





Dictation of this chart was performed using voice recognition software; 

therefore, there may be some unintended grammatical errors.


01/27/19 01:12








I spoke with the hospitalist, Dr. Weiland, who feels that the patient has no 

medical problems and therefore there is no reason for this patient to be in the 

medical service.  I did call back Dr. Nicolas who agrees to admit the patient.











- Vital Signs


Vital signs: 


                                        











Temp Pulse Resp BP Pulse Ox


 


 97.6 F   71   18   141/90 H  100 


 


 01/26/19 22:51  01/26/19 22:51  01/26/19 22:51  01/26/19 22:51  01/26/19 22:51














- Laboratory


Result Diagrams: 


                                 01/26/19 23:00





                                 01/26/19 23:00


Laboratory results interpreted by me: 


                                        











  01/26/19 01/26/19 01/26/19





  23:00 23:00 23:55


 


WBC  20.6 H  


 


Abs Neuts (Manual)  15.7 H  


 


Glucose   128 H 


 


Calcium   10.4 H 


 


Albumin   5.2 H 


 


Urine Ketones    TRACE H














Discharge





- Discharge


Clinical Impression: 


Abdominal pain


Qualifiers:


 Abdominal location: unspecified location Qualified Code(s): R10.9 - Unspecified

abdominal pain





Leukocytosis


Qualifiers:


 Leukocytosis type: unspecified Qualified Code(s): D72.829 - Elevated white 

blood cell count, unspecified





Vomiting


Qualifiers:


 Vomiting type: unspecified Vomiting Intractability: unspecified Nausea 

presence: with nausea Qualified Code(s): R11.2 - Nausea with vomiting, 

unspecified





Condition: Stable


Disposition: ADMITTED AS OBSERVATION


Admitting Provider: Surgicalist


Unit Admitted: Surgical Floor
Oriented - self; Oriented - place; Oriented - time

## 2019-05-16 NOTE — ED ADULT TRIAGE NOTE - CHIEF COMPLAINT QUOTE
Pt c/o pain, redness and swelling of left 3rd finger since this AM. Pt states she is being treated with Bactrim for multiple cuts to fingers.

## 2019-05-16 NOTE — ED ADULT NURSE NOTE - OBJECTIVE STATEMENT
Pt arrives to ER c/o pain and redness to cuticle area of bilateral middle fingers and right thumb x 3 days. Pt states she picks her cuticles and thinks they are infected. Pt also reports small white dots to left middle finger that presented overnight. [facility] and was prescribed bactrim 2 days ago. Pt also reports ulcerations on left side of her lip that she relates to stress. pt denies chest pain, denies sob, neg fever/chills.

## 2019-05-22 ENCOUNTER — APPOINTMENT (OUTPATIENT)
Dept: INTERNAL MEDICINE | Facility: CLINIC | Age: 19
End: 2019-05-22
Payer: COMMERCIAL

## 2019-05-22 DIAGNOSIS — F32.9 MAJOR DEPRESSIVE DISORDER, SINGLE EPISODE, UNSPECIFIED: ICD-10-CM

## 2019-05-22 DIAGNOSIS — B00.89 OTHER HERPESVIRAL INFECTION: ICD-10-CM

## 2019-05-22 PROCEDURE — 99214 OFFICE O/P EST MOD 30 MIN: CPT

## 2019-05-22 NOTE — HISTORY OF PRESENT ILLNESS
[FreeTextEntry1] : Was in ED for herpetic flor [de-identified] : Went to ED treated for herpetic flor\par \par -warm soaks\par -acyclovir\par \par

## 2019-05-30 ENCOUNTER — RX RENEWAL (OUTPATIENT)
Age: 19
End: 2019-05-30

## 2019-05-31 ENCOUNTER — RX RENEWAL (OUTPATIENT)
Age: 19
End: 2019-05-31

## 2019-06-02 ENCOUNTER — RX RENEWAL (OUTPATIENT)
Age: 19
End: 2019-06-02

## 2019-06-20 ENCOUNTER — MEDICATION RENEWAL (OUTPATIENT)
Age: 19
End: 2019-06-20

## 2019-08-15 ENCOUNTER — APPOINTMENT (OUTPATIENT)
Dept: OBGYN | Facility: CLINIC | Age: 19
End: 2019-08-15

## 2019-10-03 ENCOUNTER — RX RENEWAL (OUTPATIENT)
Age: 19
End: 2019-10-03

## 2019-10-03 ENCOUNTER — MEDICATION RENEWAL (OUTPATIENT)
Age: 19
End: 2019-10-03

## 2019-10-03 RX ORDER — FLUOXETINE HYDROCHLORIDE 10 MG/1
10 CAPSULE ORAL
Qty: 90 | Refills: 2 | Status: ACTIVE | COMMUNITY
Start: 2019-02-14 | End: 1900-01-01

## 2019-10-15 ENCOUNTER — TRANSCRIPTION ENCOUNTER (OUTPATIENT)
Age: 19
End: 2019-10-15

## 2019-12-23 ENCOUNTER — RX RENEWAL (OUTPATIENT)
Age: 19
End: 2019-12-23

## 2019-12-23 ENCOUNTER — MEDICATION RENEWAL (OUTPATIENT)
Age: 19
End: 2019-12-23

## 2019-12-23 RX ORDER — LAMOTRIGINE 250 MG/1
250 TABLET, EXTENDED RELEASE ORAL
Qty: 60 | Refills: 5 | Status: ACTIVE | COMMUNITY
Start: 2018-06-05 | End: 1900-01-01

## 2019-12-24 ENCOUNTER — RX RENEWAL (OUTPATIENT)
Age: 19
End: 2019-12-24

## 2020-01-13 ENCOUNTER — APPOINTMENT (OUTPATIENT)
Dept: NEUROLOGY | Facility: CLINIC | Age: 20
End: 2020-01-13

## 2020-01-14 NOTE — COUNSELING
[Nutrition] : nutrition [Breast Self Exam] : breast self exam [Vitamins/Supplements] : vitamins/supplements [Exercise] : exercise [STD (testing, results, tx)] : STD (testing, results, tx) [Contraception] : contraception [Emergency Contraception] : emergency contraception [Safe Sexual Practices] : safe sexual practices

## 2020-01-14 NOTE — PHYSICAL EXAM
[Alert] : alert [Awake] : awake [Mass] : no breast mass [Acute Distress] : no acute distress [Axillary LAD] : no axillary lymphadenopathy [Nipple Discharge] : no nipple discharge [Soft] : soft [Oriented x3] : oriented to person, place, and time [Tender] : non tender [Normal] : uterus [No Bleeding] : there was no active vaginal bleeding [Uterine Adnexae] : were not tender and not enlarged

## 2020-01-15 ENCOUNTER — APPOINTMENT (OUTPATIENT)
Dept: OBGYN | Facility: CLINIC | Age: 20
End: 2020-01-15

## 2020-05-06 ENCOUNTER — APPOINTMENT (OUTPATIENT)
Dept: OBGYN | Facility: CLINIC | Age: 20
End: 2020-05-06

## 2020-07-21 ENCOUNTER — APPOINTMENT (OUTPATIENT)
Dept: INTERNAL MEDICINE | Facility: CLINIC | Age: 20
End: 2020-07-21
Payer: COMMERCIAL

## 2020-07-21 VITALS
SYSTOLIC BLOOD PRESSURE: 98 MMHG | RESPIRATION RATE: 17 BRPM | WEIGHT: 130 LBS | DIASTOLIC BLOOD PRESSURE: 58 MMHG | BODY MASS INDEX: 20.89 KG/M2 | OXYGEN SATURATION: 98 % | TEMPERATURE: 98.6 F | HEIGHT: 66 IN | HEART RATE: 86 BPM

## 2020-07-21 DIAGNOSIS — G40.909 EPILEPSY, UNSPECIFIED, NOT INTRACTABLE, W/OUT STATUS EPILEPTICUS: ICD-10-CM

## 2020-07-21 DIAGNOSIS — D64.9 ANEMIA, UNSPECIFIED: ICD-10-CM

## 2020-07-21 DIAGNOSIS — G25.2 OTHER SPECIFIED FORMS OF TREMOR: ICD-10-CM

## 2020-07-21 DIAGNOSIS — Z00.00 ENCOUNTER FOR GENERAL ADULT MEDICAL EXAMINATION W/OUT ABNORMAL FINDINGS: ICD-10-CM

## 2020-07-21 DIAGNOSIS — F90.9 ATTENTION-DEFICIT HYPERACTIVITY DISORDER, UNSPECIFIED TYPE: ICD-10-CM

## 2020-07-21 PROCEDURE — 99213 OFFICE O/P EST LOW 20 MIN: CPT

## 2020-07-21 RX ORDER — ACYCLOVIR 50 MG/G
5 OINTMENT TOPICAL 3 TIMES DAILY
Qty: 1 | Refills: 3 | Status: DISCONTINUED | COMMUNITY
Start: 2019-05-22 | End: 2020-07-21

## 2020-07-21 NOTE — PHYSICAL EXAM
[No Acute Distress] : no acute distress [Well Nourished] : well nourished [Well Developed] : well developed [Normal Sclera/Conjunctiva] : normal sclera/conjunctiva [Well-Appearing] : well-appearing [PERRL] : pupils equal round and reactive to light [EOMI] : extraocular movements intact [Normal Outer Ear/Nose] : the outer ears and nose were normal in appearance [Normal Oropharynx] : the oropharynx was normal [No JVD] : no jugular venous distention [Supple] : supple [No Lymphadenopathy] : no lymphadenopathy [Thyroid Normal, No Nodules] : the thyroid was normal and there were no nodules present [No Respiratory Distress] : no respiratory distress  [No Accessory Muscle Use] : no accessory muscle use [Normal Rate] : normal rate  [Clear to Auscultation] : lungs were clear to auscultation bilaterally [Regular Rhythm] : with a regular rhythm [Normal S1, S2] : normal S1 and S2 [No Murmur] : no murmur heard [No Abdominal Bruit] : a ~M bruit was not heard ~T in the abdomen [No Carotid Bruits] : no carotid bruits [Pedal Pulses Present] : the pedal pulses are present [No Varicosities] : no varicosities [No Edema] : there was no peripheral edema [No Palpable Aorta] : no palpable aorta [No Extremity Clubbing/Cyanosis] : no extremity clubbing/cyanosis [Soft] : abdomen soft [Non Tender] : non-tender [Non-distended] : non-distended [No Masses] : no abdominal mass palpated [No HSM] : no HSM [Normal Bowel Sounds] : normal bowel sounds [Normal Posterior Cervical Nodes] : no posterior cervical lymphadenopathy [Normal Anterior Cervical Nodes] : no anterior cervical lymphadenopathy [No CVA Tenderness] : no CVA  tenderness [No Spinal Tenderness] : no spinal tenderness [No Joint Swelling] : no joint swelling [Grossly Normal Strength/Tone] : grossly normal strength/tone [No Rash] : no rash [Coordination Grossly Intact] : coordination grossly intact [No Focal Deficits] : no focal deficits [Normal Gait] : normal gait [Deep Tendon Reflexes (DTR)] : deep tendon reflexes were 2+ and symmetric [Normal Insight/Judgement] : insight and judgment were intact [Normal Affect] : the affect was normal

## 2020-07-21 NOTE — HISTORY OF PRESENT ILLNESS
[FreeTextEntry1] : Seizure [de-identified] : Had witnessed seizure, lasting minute 15 sec hit head\par \par -no HA\par -lump on top of head\par -hadn't eaten so thinks \par \par

## 2020-07-23 LAB
25(OH)D3 SERPL-MCNC: 38.1 NG/ML
ALBUMIN SERPL ELPH-MCNC: 4.4 G/DL
ALP BLD-CCNC: 64 U/L
ALT SERPL-CCNC: 15 U/L
ANION GAP SERPL CALC-SCNC: 16 MMOL/L
AST SERPL-CCNC: 14 U/L
BASOPHILS # BLD AUTO: 0.06 K/UL
BASOPHILS NFR BLD AUTO: 0.8 %
BILIRUB SERPL-MCNC: 0.3 MG/DL
BUN SERPL-MCNC: 13 MG/DL
CALCIUM SERPL-MCNC: 9 MG/DL
CHLORIDE SERPL-SCNC: 104 MMOL/L
CHOLEST SERPL-MCNC: 200 MG/DL
CHOLEST/HDLC SERPL: 1.9 RATIO
CO2 SERPL-SCNC: 20 MMOL/L
CREAT SERPL-MCNC: 0.89 MG/DL
EOSINOPHIL # BLD AUTO: 0.35 K/UL
EOSINOPHIL NFR BLD AUTO: 4.5 %
ESTIMATED AVERAGE GLUCOSE: 94 MG/DL
FERRITIN SERPL-MCNC: 24 NG/ML
FOLATE SERPL-MCNC: >20 NG/ML
GLUCOSE SERPL-MCNC: 90 MG/DL
HBA1C MFR BLD HPLC: 4.9 %
HCT VFR BLD CALC: 36.7 %
HDLC SERPL-MCNC: 103 MG/DL
HGB BLD-MCNC: 11.8 G/DL
IMM GRANULOCYTES NFR BLD AUTO: 0.4 %
IRON SATN MFR SERPL: 14 %
IRON SERPL-MCNC: 56 UG/DL
LDLC SERPL CALC-MCNC: 79 MG/DL
LYMPHOCYTES # BLD AUTO: 2.42 K/UL
LYMPHOCYTES NFR BLD AUTO: 31.4 %
MAN DIFF?: NORMAL
MCHC RBC-ENTMCNC: 28.4 PG
MCHC RBC-ENTMCNC: 32.2 GM/DL
MCV RBC AUTO: 88.4 FL
MONOCYTES # BLD AUTO: 0.51 K/UL
MONOCYTES NFR BLD AUTO: 6.6 %
NEUTROPHILS # BLD AUTO: 4.33 K/UL
NEUTROPHILS NFR BLD AUTO: 56.3 %
PLATELET # BLD AUTO: 368 K/UL
POTASSIUM SERPL-SCNC: 4.3 MMOL/L
PROT SERPL-MCNC: 6.6 G/DL
RBC # BLD: 4.15 M/UL
RBC # FLD: 13.2 %
SODIUM SERPL-SCNC: 140 MMOL/L
T3 SERPL-MCNC: 197 NG/DL
T3FREE SERPL-MCNC: 3.44 PG/ML
T4 FREE SERPL-MCNC: 1.2 NG/DL
T4 SERPL-MCNC: 10 UG/DL
TIBC SERPL-MCNC: 393 UG/DL
TRIGL SERPL-MCNC: 90 MG/DL
TSH SERPL-ACNC: 1.43 UIU/ML
UIBC SERPL-MCNC: 337 UG/DL
VIT B12 SERPL-MCNC: 591 PG/ML
WBC # FLD AUTO: 7.7 K/UL

## 2020-07-24 LAB
SARS-COV-2 IGG SERPL IA-ACNC: <0.1 INDEX
SARS-COV-2 IGG SERPL QL IA: NEGATIVE
THYROGLOB AB SERPL-ACNC: <20 IU/ML
THYROPEROXIDASE AB SERPL IA-ACNC: <10 IU/ML

## 2020-07-27 LAB — LAMOTRIGINE SERPL-MCNC: 10.5 MCG/ML

## 2020-09-02 RX ORDER — DROSPIRENONE AND ETHINYL ESTRADIOL 0.03MG-3MG
3-0.03 KIT ORAL
Qty: 3 | Refills: 3 | Status: ACTIVE | COMMUNITY
Start: 2017-11-29 | End: 1900-01-01

## 2020-09-08 RX ORDER — TOPIRAMATE 200 MG/1
200 TABLET, COATED ORAL DAILY
Refills: 0 | Status: ACTIVE | COMMUNITY
Start: 2020-09-08

## 2020-12-15 PROBLEM — Z30.9 ENCOUNTER FOR CONTRACEPTIVE MANAGEMENT: Status: RESOLVED | Noted: 2017-08-17 | Resolved: 2020-12-15

## 2020-12-16 PROBLEM — Z01.419 ENCOUNTER FOR ANNUAL ROUTINE GYNECOLOGICAL EXAMINATION: Status: RESOLVED | Noted: 2018-07-30 | Resolved: 2020-12-16

## 2021-07-28 ENCOUNTER — RX RENEWAL (OUTPATIENT)
Age: 21
End: 2021-07-28

## 2022-06-29 ENCOUNTER — RX RENEWAL (OUTPATIENT)
Age: 22
End: 2022-06-29

## 2022-11-30 NOTE — ED PROVIDER NOTE - GASTROINTESTINAL NEGATIVE STATEMENT, MLM
Spoke with patient to clarify fax number information on authorization. Patient states the correct fax number to fax her forms to [facility] is # [phone]. New Authorization was sent to patient on 11/30/22.      Final forms faxed to [facility] at the fax # [phone] on 11/30/22     no abdominal pain, no bloating, no constipation, no diarrhea, no nausea and no vomiting.

## 2022-12-30 NOTE — ED PROVIDER NOTE - CLINICAL SUMMARY MEDICAL DECISION MAKING FREE TEXT BOX
Yes
Patient with vesicles on fingers , mouth .    Suggestive of herpetic flor .  antiviral and continue antibiotics

## 2023-04-25 ENCOUNTER — RX RENEWAL (OUTPATIENT)
Age: 23
End: 2023-04-25

## 2023-08-19 ENCOUNTER — RX RENEWAL (OUTPATIENT)
Age: 23
End: 2023-08-19

## 2023-08-19 RX ORDER — DROSPIRENONE AND ETHINYL ESTRADIOL 0.02-3(28)
3-0.02 KIT ORAL
Qty: 28 | Refills: 2 | Status: ACTIVE | COMMUNITY
Start: 2021-07-28 | End: 1900-01-01

## 2024-10-11 NOTE — ED ADULT NURSE NOTE - NS ED NURSE DC PT EDUCATION RESOURCES
Patient requests all Lab, Cardiology, and Radiology Results on their Discharge Instructions
None needed

## 2025-01-01 NOTE — ED ADULT TRIAGE NOTE - BMI (KG/M2)
DISCHARGE SUMMARY and INSTRUCTIONS:    You are being discharged undelivered because you and your baby are in stable condition.    STATUS NOTE:  Date:  1/1/2025  Time:  0115  Destination: Home    ACTIVITY:  As tolerated    DIET:  Continue to eat a healthy pregnancy diet  Be sure to drink 8-10 glasses of water a day  Don't go more than 8-10 hours without eating or drinking    CONTACT YOUR DOCTOR OR MIDWIFE IF YOU HAVE ANY OF THESE WARNING SIGNS OF PREGNANCY:  Sudden and/or constant pain  Vaginal bleeding  Sudden gush or leaking fluid from the vagina  Fainting  Headache that will not go away with your normal comfort measures  Any changes in your vision, such as blurry vision, double vision or spots/stars before your eyes  Severe nausea and vomiting  Little or no urine, pain and burning with urination, or blood in your urine  Chills or fever  Increase or change in vaginal discharge  Your baby moves less than usual (See Fetal Movement Counting below.)      FETAL MOVEMENT COUNTING:  One way you can know your baby is doing well during pregnancy is to pay attention to his or her movements.  We recommend counting your baby's movements once each day beginning in the third trimester, or about the 26th week of pregnancy.      How to count baby's movements:  Choose a time that is convenient for you when the baby is active, such as after a meal.  Try to do it at the same time each day.  Sit comfortably or lie on your side.  Note the time on the clock when you're ready to begin counting.  Count each movement until the baby has moved 10 times.   Count all movements that are either seen or felt, including shifting, rolling, or kicking.  Do not count baby's hiccoughs.  If you have counted 10 movements in less than 2 hours, resume your normal activities and count again tomorrow.    If it takes longer than 1 hour to count 4 movements, try these suggestions:  Eat something if you haven't eaten within 2 to 3 hours before you count.  Try  to lie down in a quiet, undisturbed location, on either your right or left side.  Place your hands on your belly and focus on your baby's movements.  Continue your count from where you left off before, until you feel 10 movements.  If it took longer than 2 hours to count 10 movements, call your midwife or doctor right away for recommendations.    Remember:  By counting and staying aware of your baby's movements, you will be helping your caregivers provide the best possible care for you and your baby.      Safe Mom, Safe Baby pamphlet provided to patient, along with phone number [phone] to help with emergency housing if needed.    A copy of this form was provided to and reviewed with Gregory Antonio by TORI Ngo, 1/1/2025.  Patient verbalized understanding and has no further questions at this time.     20.8